# Patient Record
Sex: FEMALE | Race: WHITE | NOT HISPANIC OR LATINO
[De-identification: names, ages, dates, MRNs, and addresses within clinical notes are randomized per-mention and may not be internally consistent; named-entity substitution may affect disease eponyms.]

---

## 2018-07-12 ENCOUNTER — RESULT REVIEW (OUTPATIENT)
Age: 58
End: 2018-07-12

## 2019-07-23 ENCOUNTER — RESULT REVIEW (OUTPATIENT)
Age: 59
End: 2019-07-23

## 2020-03-16 PROBLEM — Z00.00 ENCOUNTER FOR PREVENTIVE HEALTH EXAMINATION: Status: ACTIVE | Noted: 2020-03-16

## 2020-05-11 ENCOUNTER — APPOINTMENT (OUTPATIENT)
Dept: BREAST CENTER | Facility: CLINIC | Age: 60
End: 2020-05-11

## 2020-07-01 ENCOUNTER — APPOINTMENT (OUTPATIENT)
Dept: BREAST CENTER | Facility: CLINIC | Age: 60
End: 2020-07-01
Payer: COMMERCIAL

## 2020-07-01 VITALS
DIASTOLIC BLOOD PRESSURE: 88 MMHG | HEART RATE: 91 BPM | WEIGHT: 185 LBS | BODY MASS INDEX: 30.82 KG/M2 | HEIGHT: 65 IN | SYSTOLIC BLOOD PRESSURE: 176 MMHG

## 2020-07-01 DIAGNOSIS — Z86.79 PERSONAL HISTORY OF OTHER DISEASES OF THE CIRCULATORY SYSTEM: ICD-10-CM

## 2020-07-01 DIAGNOSIS — Z86.018 PERSONAL HISTORY OF OTHER BENIGN NEOPLASM: ICD-10-CM

## 2020-07-01 DIAGNOSIS — Z80.49 FAMILY HISTORY OF MALIGNANT NEOPLASM OF OTHER GENITAL ORGANS: ICD-10-CM

## 2020-07-01 PROCEDURE — 99205 OFFICE O/P NEW HI 60 MIN: CPT

## 2020-07-01 RX ORDER — ATENOLOL 25 MG/1
25 TABLET ORAL
Refills: 0 | Status: ACTIVE | COMMUNITY

## 2020-07-01 NOTE — HISTORY OF PRESENT ILLNESS
[FreeTextEntry1] : Pt w/ R Scl ID Papilloma detected on Scr Sono (2/20/20)\par Mammo/Sono (2/20/20): HD, Mammo-, Sono: +7mm hypoe mass R 4:00 N3, +6mm nod mass L 10:00 N4 > Bx's rec'ed\par S/P Bilat Sono Core Bx's (2/25/20): R 4:00: +Scl ID Papilloma, L 10:00: Benign\par S/P L Sono Core Bx (2009): Benign\par S/P Wide Exc LLE Melanoma/L Groin Diss (1986, [place])\par No other Breast Surgery, Breast Procedures or Nipple Discharge. \par No FH Breast, Ovarian, Pancreatic Cancer or Melanoma.

## 2020-07-01 NOTE — PHYSICAL EXAM
[Atraumatic] : atraumatic [Normocephalic] : normocephalic [Supple] : supple [No Supraclavicular Adenopathy] : no supraclavicular adenopathy [No Cervical Adenopathy] : no cervical adenopathy [No Thyromegaly] : no thyromegaly [Normal Sinus Rhythm] : normal sinus rhythm [Examined in the supine and seated position] : examined in the supine and seated position [No dominant masses] : no dominant masses in right breast  [No dominant masses] : no dominant masses left breast [No Nipple Retraction] : no left nipple retraction [No Nipple Discharge] : no left nipple discharge [No Axillary Lymphadenopathy] : no left axillary lymphadenopathy [No Edema] : no edema [No Rashes] : no rashes [No Ulceration] : no ulceration

## 2020-07-01 NOTE — REVIEW OF SYSTEMS
[Hoarseness] : hoarseness [Constipation] : constipation [Diarrhea] : diarrhea [Negative] : Heme/Lymph

## 2020-07-24 ENCOUNTER — APPOINTMENT (OUTPATIENT)
Dept: BREAST CENTER | Facility: HOSPITAL | Age: 60
End: 2020-07-24
Payer: COMMERCIAL

## 2020-07-24 PROCEDURE — 19125 EXCISION BREAST LESION: CPT | Mod: RT

## 2020-07-30 ENCOUNTER — APPOINTMENT (OUTPATIENT)
Dept: BREAST CENTER | Facility: CLINIC | Age: 60
End: 2020-07-30
Payer: COMMERCIAL

## 2020-07-30 DIAGNOSIS — Z12.31 ENCOUNTER FOR SCREENING MAMMOGRAM FOR MALIGNANT NEOPLASM OF BREAST: ICD-10-CM

## 2020-07-30 PROCEDURE — 99024 POSTOP FOLLOW-UP VISIT: CPT

## 2020-07-30 NOTE — PHYSICAL EXAM
[de-identified] : Pt w/o c/o\par Incision C&D. Mod induration/ecchymosis. PDS d/c'ed.\par Stable. Pathology discussed.

## 2020-07-30 NOTE — HISTORY OF PRESENT ILLNESS
[FreeTextEntry1] : S/P R Br Bx w/ NL (7/24/20): No resid Pailloma. No Ca/atypia. Bx sire ID'ed\par Pt w/o c/o\par Pt w/ R Scl ID Papilloma detected on Scr Sono (2/20/20)\par Mammo/Sono (2/20/20): HD, Mammo-, Sono: +7mm hypoe mass R 4:00 N3, +6mm nod mass L 10:00 N4 > Bx's rec'ed\par S/P Bilat Sono Core Bx's (2/25/20): R 4:00: +Scl ID Papilloma, L 10:00: Benign\par S/P L Sono Core Bx (2009): Benign\par S/P Wide Exc LLE Melanoma/L Groin Diss (1986, [place])\par No other Breast Surgery, Breast Procedures or Nipple Discharge. \par No FH Breast, Ovarian, Pancreatic Cancer or Melanoma.

## 2020-09-16 ENCOUNTER — RESULT REVIEW (OUTPATIENT)
Age: 60
End: 2020-09-16

## 2021-05-04 ENCOUNTER — NON-APPOINTMENT (OUTPATIENT)
Age: 61
End: 2021-05-04

## 2021-05-27 ENCOUNTER — APPOINTMENT (OUTPATIENT)
Dept: BREAST CENTER | Facility: CLINIC | Age: 61
End: 2021-05-27
Payer: COMMERCIAL

## 2021-05-27 VITALS
WEIGHT: 185 LBS | HEART RATE: 81 BPM | SYSTOLIC BLOOD PRESSURE: 154 MMHG | DIASTOLIC BLOOD PRESSURE: 86 MMHG | BODY MASS INDEX: 30.82 KG/M2 | HEIGHT: 65 IN

## 2021-05-27 PROCEDURE — 99213 OFFICE O/P EST LOW 20 MIN: CPT

## 2021-05-27 PROCEDURE — 99072 ADDL SUPL MATRL&STAF TM PHE: CPT

## 2021-05-27 NOTE — HISTORY OF PRESENT ILLNESS
[FreeTextEntry1] : S/P R Br Bx w/ NL (20): No resid Pailloma. No Ca/atypia. Bx site ID'ed\par Pt w/ R Scl ID Papilloma detected on Scr Sono (20)\par Mammo/Sono (20): HD, Mammo-, Sono: +7mm hypoe mass R 4:00 N3, +6mm nod mass L 10:00 N4 > Bx's rec'ed\par S/P Bilat Sono Core Bx's (20): R 4:00: +Scl ID Papilloma, L 10:00: Benign\par S/P L Sono Core Bx (): Benign\par S/P Wide Exc LLE Melanoma/L Groin Diss (, [place])\par Mother  (91yo, dementia), Father 91yo > deteriorating\par Got second Moderna ()(LU)\par No other Breast Surgery, Breast Procedures or Nipple Discharge. \par No FH Breast, Ovarian, Pancreatic Cancer or Melanoma. \par No MH/FH changes. Taking Ca/D. ROS reviewed/discussed. LMP () > +toño M, +HF's. Last Bone Densitometry (?)\par Mammo/Sono (21): HD, NSF

## 2021-05-27 NOTE — PHYSICAL EXAM
[Normocephalic] : normocephalic [Atraumatic] : atraumatic [Supple] : supple [No Supraclavicular Adenopathy] : no supraclavicular adenopathy [No Cervical Adenopathy] : no cervical adenopathy [No Thyromegaly] : no thyromegaly [Normal Sinus Rhythm] : normal sinus rhythm [Examined in the supine and seated position] : examined in the supine and seated position [No dominant masses] : no dominant masses in right breast  [No dominant masses] : no dominant masses left breast [No Nipple Retraction] : no left nipple retraction [No Nipple Discharge] : no left nipple discharge [No Axillary Lymphadenopathy] : no left axillary lymphadenopathy [No Edema] : no edema [No Rashes] : no rashes [No Ulceration] : no ulceration [de-identified] : R Devin Bx inc WH\par NSF

## 2021-05-27 NOTE — REVIEW OF SYSTEMS
[Recent Weight Gain (___ Lbs)] : recent [unfilled] ~Ulb weight gain [Nosebleeds] : nosebleeds [Negative] : Heme/Lymph

## 2021-09-28 ENCOUNTER — RESULT REVIEW (OUTPATIENT)
Age: 61
End: 2021-09-28

## 2021-12-08 ENCOUNTER — APPOINTMENT (OUTPATIENT)
Dept: BREAST CENTER | Facility: CLINIC | Age: 61
End: 2021-12-08
Payer: COMMERCIAL

## 2021-12-08 VITALS
SYSTOLIC BLOOD PRESSURE: 157 MMHG | DIASTOLIC BLOOD PRESSURE: 77 MMHG | BODY MASS INDEX: 30.82 KG/M2 | WEIGHT: 185 LBS | HEIGHT: 65 IN

## 2021-12-08 PROCEDURE — 99213 OFFICE O/P EST LOW 20 MIN: CPT

## 2021-12-08 NOTE — HISTORY OF PRESENT ILLNESS
[FreeTextEntry1] : S/P R Br Bx w/ NL (20): No resid Pailloma. No Ca/atypia. Bx site ID'ed\par Pt w/ R Scl ID Papilloma detected on Scr Sono (20)\par Mammo/Sono (20): HD, Mammo-, Sono: +7mm hypoe mass R 4:00 N3, +6mm nod mass L 10:00 N4 > Bx's rec'ed\par S/P Bilat Sono Core Bx's (20): R 4:00: +Scl ID Papilloma, L 10:00: Benign\par S/P L Sono Core Bx (): Benign\par S/P Wide Exc LLE Melanoma/L Groin Diss (, [place])\par Mother  (89yo, dementia)\par Father  ()(90yo)\par Got second Moderna ()(LUE)\par No other Breast Surgery, Breast Procedures or Nipple Discharge. \par No FH Breast, Ovarian, Pancreatic Cancer or Melanoma. \par No MH/FH changes. Taking Ca/D. ROS reviewed/discussed. LMP () >+ M, +HF's. Last Bone Densitometry (?)\par Mammo/Sono (21): HD, NSF

## 2021-12-08 NOTE — PHYSICAL EXAM
[Normocephalic] : normocephalic [Atraumatic] : atraumatic [Supple] : supple [No Supraclavicular Adenopathy] : no supraclavicular adenopathy [No Cervical Adenopathy] : no cervical adenopathy [No Thyromegaly] : no thyromegaly [Normal Sinus Rhythm] : normal sinus rhythm [Examined in the supine and seated position] : examined in the supine and seated position [No dominant masses] : no dominant masses in right breast  [No dominant masses] : no dominant masses left breast [No Nipple Retraction] : no left nipple retraction [No Nipple Discharge] : no left nipple discharge [No Axillary Lymphadenopathy] : no left axillary lymphadenopathy [No Edema] : no edema [No Rashes] : no rashes [No Ulceration] : no ulceration [de-identified] : +focal puckering R LIQ at Needle entry site\par

## 2022-06-09 ENCOUNTER — APPOINTMENT (OUTPATIENT)
Dept: BREAST CENTER | Facility: CLINIC | Age: 62
End: 2022-06-09

## 2022-06-09 VITALS
DIASTOLIC BLOOD PRESSURE: 74 MMHG | WEIGHT: 183 LBS | HEART RATE: 87 BPM | HEIGHT: 65 IN | SYSTOLIC BLOOD PRESSURE: 149 MMHG | BODY MASS INDEX: 30.49 KG/M2

## 2022-06-09 PROCEDURE — 99213 OFFICE O/P EST LOW 20 MIN: CPT

## 2022-06-09 RX ORDER — LOSARTAN POTASSIUM 25 MG/1
25 TABLET, FILM COATED ORAL
Qty: 90 | Refills: 0 | Status: ACTIVE | COMMUNITY
Start: 2021-12-11

## 2022-06-09 NOTE — HISTORY OF PRESENT ILLNESS
[FreeTextEntry1] : S/P R Br Bx w/ NL (20): No resid Pailloma. No Ca/atypia. Bx site ID'ed\par Pt w/ R Scl ID Papilloma detected on Scr Sono (20)\par Mammo/Sono (20): HD, Mammo-, Sono: +7mm hypoe mass R 4:00 N3, +6mm nod mass L 10:00 N4 > Bx's rec'ed\par S/P Bilat Sono Core Bx's (20): R 4:00: +Scl ID Papilloma, L 10:00: Benign\par S/P L Sono Core Bx (): Benign\par S/P Wide Exc LLE Melanoma/L Groin Diss (, [place])\par Mother  (89yo, dementia)\par Father  ()(92yo)\par Had COVID () > No sx's\par Got Moderna booster ()(LUE)\par No other Breast Surgery, Breast Procedures or Nipple Discharge. \par No FH Breast, Ovarian, Pancreatic Cancer or Melanoma. \par No MH/FH changes. Taking Ca/D. ROS reviewed/discussed. LMP () >+ M, +HF's. Last Bone Densitometry (?)\par Mammo/Sono (22): HD, NSF

## 2022-09-10 ENCOUNTER — NON-APPOINTMENT (OUTPATIENT)
Age: 62
End: 2022-09-10

## 2022-09-29 ENCOUNTER — RESULT REVIEW (OUTPATIENT)
Age: 62
End: 2022-09-29

## 2023-03-30 ENCOUNTER — APPOINTMENT (OUTPATIENT)
Dept: BREAST CENTER | Facility: CLINIC | Age: 63
End: 2023-03-30
Payer: MEDICAID

## 2023-03-30 VITALS
BODY MASS INDEX: 30.82 KG/M2 | HEART RATE: 87 BPM | SYSTOLIC BLOOD PRESSURE: 135 MMHG | DIASTOLIC BLOOD PRESSURE: 80 MMHG | WEIGHT: 185 LBS | HEIGHT: 65 IN

## 2023-03-30 DIAGNOSIS — B36.9 SUPERFICIAL MYCOSIS, UNSPECIFIED: ICD-10-CM

## 2023-03-30 PROCEDURE — 99213 OFFICE O/P EST LOW 20 MIN: CPT

## 2023-03-30 RX ORDER — KETOCONAZOLE 20 MG/G
2 CREAM TOPICAL
Qty: 30 | Refills: 0 | Status: DISCONTINUED | COMMUNITY
Start: 2022-03-01 | End: 2023-03-30

## 2023-03-30 NOTE — PHYSICAL EXAM
[Normocephalic] : normocephalic [Atraumatic] : atraumatic [Supple] : supple [No Supraclavicular Adenopathy] : no supraclavicular adenopathy [No Cervical Adenopathy] : no cervical adenopathy [No Thyromegaly] : no thyromegaly [Normal Sinus Rhythm] : normal sinus rhythm [Examined in the supine and seated position] : examined in the supine and seated position [No dominant masses] : no dominant masses in right breast  [No dominant masses] : no dominant masses left breast [No Nipple Retraction] : no left nipple retraction [No Nipple Discharge] : no left nipple discharge [No Axillary Lymphadenopathy] : no left axillary lymphadenopathy [No Edema] : no edema [No Ulceration] : no ulceration [No Rashes] : no rashes [de-identified] : +FC tissue\par NSF  [de-identified] : +FC tissue\par NSF

## 2023-03-30 NOTE — HISTORY OF PRESENT ILLNESS
[FreeTextEntry1] : S/P R Br Bx w/ NL (20): No resid Pailloma. No Ca/atypia. Bx site ID'ed\par Pt w/ R Scl ID Papilloma detected on Scr Sono (20)\par Mammo/Sono (20): HD, Mammo-, Sono: +7mm hypoe mass R 4:00 N3, +6mm nod mass L 10:00 N4 > Bx's rec'ed\par S/P Bilat Sono Core Bx's (20): R 4:00: +Scl ID Papilloma, L 10:00: Benign\par S/P L Sono Core Bx (): Benign\par S/P Wide Exc LLE Melanoma/L Groin Diss (, [place])\par Mother  (91yo, dementia)\par Father  ()(92yo)\par Colonoscopy(): "WNL" > 10yrs \par PAP/Pelvic (10/22): "WNL" \par Had COVID () > No sx's\par Got Moderna booster ()(LUE)\par No other Breast Surgery, Breast Procedures or Nipple Discharge. \par No FH Breast, Ovarian, Pancreatic Cancer or Melanoma. \par No MH/FH changes. Taking Ca/D. ROS reviewed/discussed. LMP () >+ M, +HF's. Last Bone Densitometry (?)\par Mammo/Sono (22): HD, NSF

## 2023-09-27 ENCOUNTER — TRANSCRIPTION ENCOUNTER (OUTPATIENT)
Age: 63
End: 2023-09-27

## 2024-03-28 ENCOUNTER — APPOINTMENT (OUTPATIENT)
Dept: BREAST CENTER | Facility: CLINIC | Age: 64
End: 2024-03-28
Payer: MEDICAID

## 2024-03-28 VITALS
HEART RATE: 82 BPM | BODY MASS INDEX: 30.82 KG/M2 | SYSTOLIC BLOOD PRESSURE: 141 MMHG | WEIGHT: 185 LBS | HEIGHT: 65 IN | DIASTOLIC BLOOD PRESSURE: 80 MMHG

## 2024-03-28 DIAGNOSIS — R92.30 DENSE BREASTS, UNSPECIFIED: ICD-10-CM

## 2024-03-28 DIAGNOSIS — Z85.820 PERSONAL HISTORY OF MALIGNANT MELANOMA OF SKIN: ICD-10-CM

## 2024-03-28 DIAGNOSIS — Z86.018 PERSONAL HISTORY OF OTHER BENIGN NEOPLASM: ICD-10-CM

## 2024-03-28 DIAGNOSIS — N60.19 DIFFUSE CYSTIC MASTOPATHY OF UNSPECIFIED BREAST: ICD-10-CM

## 2024-03-28 PROCEDURE — 99213 OFFICE O/P EST LOW 20 MIN: CPT

## 2024-03-28 NOTE — HISTORY OF PRESENT ILLNESS
[FreeTextEntry1] : S/P R Br Bx w/ NL (20): No resid Papilloma. No Ca/atypia. Bx site ID'ed Pt w/ R Scl ID Papilloma detected on Scr Sono (20) Mammo/Sono (20): HD, Mammo-, Sono: +7mm hypoe mass R 4:00 N3, +6mm nod mass L 10:00 N4 > Bx's rec'ed S/P Bilat Sono Core Bx's (20): R 4:00: +Scl ID Papilloma, L 10:00: Benign S/P L Sono Core Bx (): Benign S/P Wide Exc LLE Melanoma/L Groin Diss (, [place]) Mother  (91yo, dementia) Father  ()(90yo) Colonoscopy(): "WNL" > 10yrs  PAP/Pelvic (10/23): "WNL"  S/P Endom Bx for spotting (10/23): Benign Had COVID () > No sx's Got Moderna booster ()(LUE) No other Breast Surgery, Breast Procedures or Nipple Discharge.  No FH Breast, Ovarian, Pancreatic Cancer or Melanoma.  No MH/FH changes. Taking Ca/D. ROS reviewed/discussed. LMP () >+ M, +HF's. Last Bone Densitometry (?) Mammo/Sono (23): HD, NSF

## 2024-03-28 NOTE — PHYSICAL EXAM
[Normocephalic] : normocephalic [Atraumatic] : atraumatic [Supple] : supple [No Supraclavicular Adenopathy] : no supraclavicular adenopathy [No Cervical Adenopathy] : no cervical adenopathy [No Thyromegaly] : no thyromegaly [Normal Sinus Rhythm] : normal sinus rhythm [Examined in the supine and seated position] : examined in the supine and seated position [No dominant masses] : no dominant masses in right breast  [No dominant masses] : no dominant masses left breast [No Nipple Retraction] : no left nipple retraction [No Nipple Discharge] : no left nipple discharge [No Axillary Lymphadenopathy] : no left axillary lymphadenopathy [No Edema] : no edema [No Rashes] : no rashes [No Ulceration] : no ulceration [de-identified] : +FC tissue NSF  [de-identified] : +FC tissue NSF

## 2024-06-04 ENCOUNTER — RESULT REVIEW (OUTPATIENT)
Age: 64
End: 2024-06-04

## 2025-04-11 ENCOUNTER — NON-APPOINTMENT (OUTPATIENT)
Age: 65
End: 2025-04-11

## 2025-04-16 ENCOUNTER — APPOINTMENT (OUTPATIENT)
Dept: BREAST CENTER | Facility: CLINIC | Age: 65
End: 2025-04-16
Payer: MEDICAID

## 2025-04-16 VITALS — BODY MASS INDEX: 30.82 KG/M2 | WEIGHT: 185 LBS | HEIGHT: 65 IN

## 2025-04-16 DIAGNOSIS — N60.19 DIFFUSE CYSTIC MASTOPATHY OF UNSPECIFIED BREAST: ICD-10-CM

## 2025-04-16 DIAGNOSIS — Z85.820 PERSONAL HISTORY OF MALIGNANT MELANOMA OF SKIN: ICD-10-CM

## 2025-04-16 DIAGNOSIS — R92.30 DENSE BREASTS, UNSPECIFIED: ICD-10-CM

## 2025-04-16 DIAGNOSIS — Z86.79 PERSONAL HISTORY OF OTHER DISEASES OF THE CIRCULATORY SYSTEM: ICD-10-CM

## 2025-04-16 DIAGNOSIS — Z86.018 PERSONAL HISTORY OF OTHER BENIGN NEOPLASM: ICD-10-CM

## 2025-04-16 PROCEDURE — 99213 OFFICE O/P EST LOW 20 MIN: CPT

## 2025-06-09 ENCOUNTER — RESULT REVIEW (OUTPATIENT)
Age: 65
End: 2025-06-09